# Patient Record
Sex: MALE | Race: WHITE
[De-identification: names, ages, dates, MRNs, and addresses within clinical notes are randomized per-mention and may not be internally consistent; named-entity substitution may affect disease eponyms.]

---

## 2019-07-08 ENCOUNTER — HOSPITAL ENCOUNTER (INPATIENT)
Dept: HOSPITAL 53 - M ED | Age: 79
LOS: 2 days | Discharge: HOME | DRG: 205 | End: 2019-07-10
Attending: GENERAL PRACTICE | Admitting: HOSPITALIST
Payer: MEDICARE

## 2019-07-08 VITALS — HEIGHT: 67 IN | BODY MASS INDEX: 40.62 KG/M2 | WEIGHT: 258.82 LBS

## 2019-07-08 DIAGNOSIS — E87.70: ICD-10-CM

## 2019-07-08 DIAGNOSIS — Z88.5: ICD-10-CM

## 2019-07-08 DIAGNOSIS — M19.012: ICD-10-CM

## 2019-07-08 DIAGNOSIS — M19.011: ICD-10-CM

## 2019-07-08 DIAGNOSIS — K21.9: ICD-10-CM

## 2019-07-08 DIAGNOSIS — Z96.659: ICD-10-CM

## 2019-07-08 DIAGNOSIS — R07.9: ICD-10-CM

## 2019-07-08 DIAGNOSIS — J44.9: ICD-10-CM

## 2019-07-08 DIAGNOSIS — R53.81: ICD-10-CM

## 2019-07-08 DIAGNOSIS — E78.00: ICD-10-CM

## 2019-07-08 DIAGNOSIS — I34.0: ICD-10-CM

## 2019-07-08 DIAGNOSIS — M16.0: ICD-10-CM

## 2019-07-08 DIAGNOSIS — E66.2: Primary | ICD-10-CM

## 2019-07-08 DIAGNOSIS — D63.8: ICD-10-CM

## 2019-07-08 DIAGNOSIS — Z90.5: ICD-10-CM

## 2019-07-08 DIAGNOSIS — Z66: ICD-10-CM

## 2019-07-08 DIAGNOSIS — N25.81: ICD-10-CM

## 2019-07-08 DIAGNOSIS — N40.0: ICD-10-CM

## 2019-07-08 DIAGNOSIS — N18.6: ICD-10-CM

## 2019-07-08 DIAGNOSIS — Z79.899: ICD-10-CM

## 2019-07-08 DIAGNOSIS — I48.92: ICD-10-CM

## 2019-07-08 DIAGNOSIS — I13.2: ICD-10-CM

## 2019-07-08 DIAGNOSIS — Z90.49: ICD-10-CM

## 2019-07-08 DIAGNOSIS — R09.02: ICD-10-CM

## 2019-07-08 DIAGNOSIS — E87.1: ICD-10-CM

## 2019-07-08 DIAGNOSIS — I50.32: ICD-10-CM

## 2019-07-08 DIAGNOSIS — I27.20: ICD-10-CM

## 2019-07-08 DIAGNOSIS — Z99.2: ICD-10-CM

## 2019-07-08 LAB
ALBUMIN SERPL BCG-MCNC: 3.3 GM/DL (ref 3.2–5.2)
ALT SERPL W P-5'-P-CCNC: 14 U/L (ref 12–78)
APTT BLD: 35.7 SECONDS (ref 25–38.4)
BILIRUB SERPL-MCNC: 0.5 MG/DL (ref 0.2–1)
BUN SERPL-MCNC: 29 MG/DL (ref 7–18)
CALCIUM SERPL-MCNC: 9.5 MG/DL (ref 8.8–10.2)
CHLORIDE SERPL-SCNC: 95 MEQ/L (ref 98–107)
CK MB CFR.DF SERPL CALC: 2.86
CK MB SERPL-MCNC: < 1 NG/ML (ref ?–3.6)
CK SERPL-CCNC: 35 U/L (ref 39–308)
CO2 SERPL-SCNC: 33 MEQ/L (ref 21–32)
CREAT SERPL-MCNC: 5.32 MG/DL (ref 0.7–1.3)
GFR SERPL CREATININE-BSD FRML MDRD: 11.2 ML/MIN/{1.73_M2} (ref 42–?)
GLUCOSE SERPL-MCNC: 118 MG/DL (ref 70–100)
HCT VFR BLD AUTO: 28.4 % (ref 42–52)
HGB BLD-MCNC: 9.2 G/DL (ref 13.5–17.5)
INR PPP: 1.27
MCH RBC QN AUTO: 31.9 PG (ref 27–33)
MCHC RBC AUTO-ENTMCNC: 32.4 G/DL (ref 32–36.5)
MCV RBC AUTO: 98.6 FL (ref 80–96)
NT-PRO BNP: (no result) PG/ML (ref ?–450)
PLATELET # BLD AUTO: 135 10^3/UL (ref 150–450)
POTASSIUM SERPL-SCNC: 3.8 MEQ/L (ref 3.5–5.1)
PROT SERPL-MCNC: 6.8 GM/DL (ref 6.4–8.2)
PROTHROMBIN TIME: 15.6 SECONDS (ref 11.8–14)
RBC # BLD AUTO: 2.88 10^6/UL (ref 4.3–6.1)
SODIUM SERPL-SCNC: 137 MEQ/L (ref 136–145)
TROPONIN I SERPL-MCNC: 0.02 NG/ML (ref ?–0.1)
WBC # BLD AUTO: 11.2 10^3/UL (ref 4–10)

## 2019-07-08 PROCEDURE — 5A1D70Z PERFORMANCE OF URINARY FILTRATION, INTERMITTENT, LESS THAN 6 HOURS PER DAY: ICD-10-PCS | Performed by: INTERNAL MEDICINE

## 2019-07-08 NOTE — REPVR
EXAM: 

 CT Angiography Chest With Contrast 



EXAM DATE/TIME: 

 7/8/2019 11:00 PM 



CLINICAL HISTORY: 

 78 years old, male; Dyspnea and shortness of breath; Additional info: Dysp 



TECHNIQUE: 

 Imaging protocol: Axial computed tomographic angiography images of the chest 

with intravenous contrast using CT angiography protocol. Coronal and sagittal 

reformatted images were created and reviewed. 

 3D rendering: MIP reconstructed images were created and reviewed. 

 Radiation optimization: All CT scans at this facility use at least one of 

these dose optimization techniques: automated exposure control; mA and/or kV 

adjustment per patient size (includes targeted exams where dose is matched to 

clinical indication); or iterative reconstruction. 

 Contrast material: ISOVUE 370; Contrast volume: 75 ml; Contrast route: IV; 



COMPARISON: 

 CR Chest, 2 view PA, Lat 7/8/2019 9:50 PM 



FINDINGS: 

 Pulmonary arteries: There are no pulmonary emboli. Contrast density in the 

pulmonary arteries not optimized to exclude small peripheral pulmonary emboli. 

 Aorta: The aorta demonstrates mild atherosclerotic calcification. There is no 

aortic dissection or aneurysm. 

 Lungs: Bibasilar infiltrates likely atelectatic. Clinical correlation to 

exclude infection suggested. 

 Pleural space: Unremarkable. No pneumothorax. No pleural effusion. 

 Heart: There is moderate atherosclerotic calcification of the coronary 

arteries. Small pericardial effusion. Cardiomegaly. 

 Kidneys and ureters: Right renal atrophy. Status post left nephrectomy. 

 Lymph nodes: Unremarkable. No enlarged lymph nodes. 

 Bones/joints: Arthropathic changes in the glenohumeral joints. The spine 

demonstrates mild degenerative changes. 

 Soft tissues: Unremarkable. 



IMPRESSION: 

1. Bibasilar infiltrates likely atelectatic. Clinical correlation to exclude 

infection suggested. 

2. There is no aortic dissection or aneurysm. 

3. Small pericardial effusion. 

4. There are no pulmonary emboli. Contrast density in the pulmonary arteries 

not optimized to exclude small peripheral pulmonary emboli. 



Electronically signed by: Isaias Olivarez On 07/08/2019  23:40:31 PM

## 2019-07-09 VITALS — DIASTOLIC BLOOD PRESSURE: 56 MMHG | SYSTOLIC BLOOD PRESSURE: 132 MMHG

## 2019-07-09 VITALS — SYSTOLIC BLOOD PRESSURE: 108 MMHG | DIASTOLIC BLOOD PRESSURE: 52 MMHG

## 2019-07-09 VITALS — SYSTOLIC BLOOD PRESSURE: 134 MMHG | DIASTOLIC BLOOD PRESSURE: 43 MMHG

## 2019-07-09 LAB
ALBUMIN SERPL BCG-MCNC: 3.1 GM/DL (ref 3.2–5.2)
BUN SERPL-MCNC: 38 MG/DL (ref 7–18)
CALCIUM SERPL-MCNC: 9.6 MG/DL (ref 8.8–10.2)
CHLORIDE SERPL-SCNC: 100 MEQ/L (ref 98–107)
CO2 SERPL-SCNC: 31 MEQ/L (ref 21–32)
CREAT SERPL-MCNC: 6.13 MG/DL (ref 0.7–1.3)
FERRITIN SERPL-MCNC: 1343 NG/ML (ref 26–388)
GFR SERPL CREATININE-BSD FRML MDRD: 9.5 ML/MIN/{1.73_M2} (ref 42–?)
GLUCOSE SERPL-MCNC: 111 MG/DL (ref 70–100)
HCT VFR BLD AUTO: 27.3 % (ref 42–52)
HGB BLD-MCNC: 8.5 G/DL (ref 13.5–17.5)
IRON SATN MFR SERPL: 9 % (ref 19.7–50)
IRON SERPL-MCNC: 19 UG/DL (ref 65–175)
MCH RBC QN AUTO: 30.1 PG (ref 27–33)
MCHC RBC AUTO-ENTMCNC: 31.1 G/DL (ref 32–36.5)
MCV RBC AUTO: 96.8 FL (ref 80–96)
PHOSPHATE SERPL-MCNC: 4 MG/DL (ref 2.5–4.9)
PLATELET # BLD AUTO: 127 10^3/UL (ref 150–450)
POTASSIUM SERPL-SCNC: 3.5 MEQ/L (ref 3.5–5.1)
RBC # BLD AUTO: 2.82 10^6/UL (ref 4.3–6.1)
SODIUM SERPL-SCNC: 132 MEQ/L (ref 136–145)
TIBC SERPL-MCNC: 210 UG/DL (ref 250–450)
TROPONIN I SERPL-MCNC: 0.09 NG/ML (ref ?–0.1)
TSH SERPL DL<=0.005 MIU/L-ACNC: 0.89 UIU/ML (ref 0.36–3.74)
WBC # BLD AUTO: 11.4 10^3/UL (ref 4–10)

## 2019-07-09 RX ADMIN — APIXABAN SCH MG: 2.5 TABLET, FILM COATED ORAL at 09:59

## 2019-07-09 RX ADMIN — ACETAMINOPHEN SCH MG: 650 TABLET, FILM COATED, EXTENDED RELEASE ORAL at 20:23

## 2019-07-09 RX ADMIN — SEVELAMER CARBONATE SCH MG: 800 TABLET, FILM COATED ORAL at 17:36

## 2019-07-09 RX ADMIN — APIXABAN SCH MG: 2.5 TABLET, FILM COATED ORAL at 20:24

## 2019-07-09 RX ADMIN — DOCUSATE SODIUM SCH MG: 100 CAPSULE, LIQUID FILLED ORAL at 09:59

## 2019-07-09 RX ADMIN — ALUMINUM HYDROXIDE, MAGNESIUM HYDROXIDE, AND SIMETHICONE PRN ML: 200; 200; 20 SUSPENSION ORAL at 16:28

## 2019-07-09 RX ADMIN — FERROUS GLUCONATE SCH MG: 324 TABLET ORAL at 20:23

## 2019-07-09 RX ADMIN — LANTHANUM CARBONATE SCH MG: 500 TABLET, CHEWABLE ORAL at 17:41

## 2019-07-09 RX ADMIN — DOCUSATE SODIUM SCH MG: 100 CAPSULE, LIQUID FILLED ORAL at 20:24

## 2019-07-09 NOTE — HPEPDOC
General


Date of Admission


2019 at 01:46


Date of Service:  2019


Primary Care Physician:  Max Bob


Other Providers


Dr Leiva


Attending Physician:  NEERAJ VALE DO


Chief Complaint


The patient is a 78-year-old male admitted with a reason for visit of Chronic 

Renal Failure, Dyspnea.


Source:  Patient, Family, Old records


Exam Limitations:  No limitations, Mild cognitive slowing


Timing/Duration:  Week(s) (1-2), Getting worse, Changing over time, This evening


Severity:  Moderate


Associated Symptoms:  Chest Pain, Cough, Malaise, Nausea, Shortness of breath, 

Weakness, Other (increased acid reflux)





History of Present Illness


79 yo male with HTN and ESRD presented to ED with increasing reflux,CP, SOB and 

cough.  Patient had dialysis today  and felt fine but then when he returned 

home, per wife and daughter, he became more weak and confused with oxygen 

saturation 88% on RA.  They states every night for past 1-2 weeks, when he eats 

before bed (ie popcorn), he has increased gastric reflux and chest pain and SOB.

Workup in the ED showed a CXR without CHF or aspiration, CTA chest without PE. 

He was to be discharged home however, Per the ED , Dr Christian is requesting 

admission of this patient for further evaluation of his SOB, hypoxia and 

debility





Home Medications


Scheduled


Acetaminophen (Tylenol Arthritis) 650 Mg Tablet.er, 1,300 MG PO BID, (Reported)


Amlodipine Besylate (Amlodipine Besylate) 10 Mg Tablet, 10 MG PO 4XWK, 

(Reported)


   TUESDAY, THURSDAY, SATURDAY AND  


Amlodipine Besylate (Amlodipine Besylate) 10 Mg Tablet, 5 MG PO 3XW, (Reported)


   MONDAY, WEDNESDAY AND FRIDAY 


Calcitriol (Rocaltrol) 0.5 Mcg Capsule, 0.5 MCG PO 2XW, (Reported)


   TUESDAY AND THURSDAY AT NOON 


Cholecalciferol (Vitamin D3) (Vitamin D3) 50,000 Unit Capsule, 50,000 UNIT PO 

1XWK, (Reported)


   WEDNESDAY AT NOON 


Cinacalcet HCl (Cinacalcet HCl) 30 Mg Tablet, 30 MG PO QPM, (Reported)


   DINNER 


Ferrous Gluconate (Ferrous Gluconate) 324 Mg Tablet, 324 MG PO BID, (Reported)


   MORNING AND DINNER 


Finasteride (Finasteride) 5 Mg Tablet, 5 MG PO QPM, (Reported)


   DINNER 


Folic Acid/Vit B Complex and C (Gisela-[place]ugenia Tablet) 0.8 Mg Tablet, 1 TAB PO QPM, 

(Reported)


   DINNER 


Gabapentin (Gabapentin) 100 Mg Capsule, 100 MG PO QHS, (Reported)


L.acidoph/L.bulg/B.bif/S.therm (Bacid Caplet) 1 Each Tablet, 1 TAB PO QPM, 

(Reported)


   DINNER 


Labetalol HCl (Labetalol HCl) 200 Mg Tablet, 200 MG PO TID, (Reported)


   MORNING, NOON AND BEDTIME 


Lanthanum Carbonate (Lanthanum Carbonate) 500 Mg Tab.chew, 500 MG PO WM, 

(Reported)


Lidocaine/Prilocaine (Lidocaine-Prilocaine Cream) 2.5%/2.5% Cream..g., 1 DOSE 

TOP 3XW, (Reported)


   PRIOR TO DIALYSIS ON MONDAY, WEDNESDAY AND FRIDAY 


[place]-3/Dha/Epa/Fish Oil (Omega-3 Fish Oil 1,000 mg Sfgl) 1,000 Mg Capsule, 2 

CAP PO QHS, (Reported)


Omeprazole (Omeprazole) 20 Mg Capsule.dr, 20 MG PO DAILY, (Reported)


   NOON 


Pravastatin Sodium (Pravastatin Sodium) 10 Mg Tablet, 10 MG PO QHS, (Reported)


Ropinirole HCl (Ropinirole HCl) 0.5 Mg Tablet, 0.5 MG PO QHS, (Reported)


Sevelamer Carbonate (Renvela) 800 Mg Tablet, 800 MG PO WM, (Reported)


Terazosin HCl (Terazosin HCl) 5 Mg Capsule, 5 MG PO DAILY, (Reported)





Scheduled PRN


Albuterol Sulf (Albuterol Sulfate) 2.5 Mg/3 Ml Vial.neb, 2.5 MG INH QID PRN for 

SHORTNESS OF BREATH, (Reported)


Ipratropium Bromide (Ipratropium Bromide) 0.2 Mg/1 Ml Solution, 0.5 MG INH BID 

PRN for SHORTNESS OF BREATH, (Reported)


   MIXES WITH ALBUTEROL TWICE A DAY AS NEEDED 


Mometasone Furoate (Asmanex) 220 Mcg Aer.pow.ba, 1 PUFF INH BID PRN for 

SHORTNESS OF BREATH, (Reported)





Allergies


Coded Allergies:  


     codeine (Verified  Allergy, Unknown, 19)





Past Medical History


Medical History


ESRD (dialysis M,W,F)   


HTN   


COPD (due to exposure at saw [employer])  


OA hip and shoulders


HE DENIES DM,HYPOTHYROID,CAD or LISSET





Past surgical history:


partial nephrectomy right


total nephrectomy left


partial bowel resection


2 knee replacement





Family history: mother  brain aneurysm, father  cancer





Social history: never smoked. 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 

etoh use





A-FIB/CHADSVASC


A-FIB History


Current/History of A-Fib/PAF?:  No





Review of Systems


Other systems


10 systems reviewed and negative except as per HPI





Physical Examination


General Exam:  Positive: Alert, Cooperative, Mild Distress, Other (pickwickian 

appearing)


Eye Exam:  Positive: PERRLA, Conjunctiva & lids normal, EOMI


ENT Exam:  Positive: Atraumatic, Mucous membr. moist/pink, Pharynx Normal


Neck Exam:  Positive: Supple, +2 carotid pulse wo bruit, Other (no JVD;  thick 

neck circumfrance)


Chest Exam:  Positive: Clear to auscultation, Normal air movement; 


   Negative: Rales, Rhonchi, Wheezing, Diminished


Heart Exam:  Positive: Rate Normal, Irregular Rhythm


Telemetry:  Positive: Other Telemetry: (aflutter at 70 bpm)


Abdomen Exam:  Positive: Normal bowel sounds, Soft (NT ND , obese)


Extremity Exam:  Positive: Edema (1+ bilateral edema to legs), Normal pulses, 

Other (left arm fistula intact with thrill); 


   Negative: Clubbing, Cyanosis


Skin Exam:  Positive: Nl turgor and temperature


Neuro Exam:  Positive: Normal Speech, Strength at 5/5 X4 ext (tested while 

sitting at edge of bed.  ), Sensation Intact, Cranial Nerves 3-12 NL


Psych Exam:  Positive: Mood NL, Oriented x 3, Other (mental status slow but 

intact)


Other physical findings


CTA CHEST:IMPRESSION: 


1. Bibasilar infiltrates likely atelectatic. Clinical correlation to exclude 


infection suggested. 


2. There is no aortic dissection or aneurysm. 


3. Small pericardial effusion. 


4. There are no pulmonary emboli. Contrast density in the pulmonary arteries 


not optimized to exclude small peripheral pulmonary emboli. 





EKG reviewed and rate controlled aflutter present





Vital Signs





Vital Signs








  Date Time  Temp Pulse Resp B/P (MAP) Pulse Ox O2 Delivery O2 Flow Rate FiO2


 


19 00:45  64 18 122/55 (77) 93 Nasal Cannula 2.0 


 


19 20:24 99.4       











Laboratory Data


Labs 24H


Laboratory Tests 2


19 20:31: 


Nucleated Red Blood Cells % (auto) 0.0, Prothrombin Time 15.6H, Prothromb Time 

International Ratio 1.27, Activated Partial Thromboplast Time 35.7, Anion Gap 9,

Glomerular Filtration Rate 11.2L, Blood Urea Nitrogen 29H, Creatinine 5.32H, 

Sodium Level 137, Potassium Level 3.8, Chloride Level 95L, Carbon Dioxide Level 

33H, Calcium Level 9.5, Aspartate Amino Transf (AST/SGOT) 9, Alanine 

Aminotransferase (ALT/SGPT) 14, Total Creatine Kinase 35L, Alkaline Phosphatase 

62, Total Bilirubin 0.5, Total Protein 6.8, Albumin 3.3, Creatine Kinase MB < 

1.0, Creatine Kinase MB Relative Index 2.86, Troponin I 0.02, NT-Pro-B-Type 

Natriuretic Peptide 76616H, Albumin/Globulin Ratio 0.94L


CBC/BMP


Laboratory Tests


19 20:31








Red Blood Count 2.88 L, Mean Corpuscular Volume 98.6 H, Mean Corpuscular 

Hemoglobin 31.9, Mean Corpuscular Hemoglobin Concent 32.4, Red Cell Distribution

Width 14.8 H, Calcium Level 9.5, Aspartate Amino Transf (AST/SGOT) 9, Alanine 

Aminotransferase (ALT/SGPT) 14, Total Creatine Kinase 35 L, Alkaline Phosphatase

62, Total Bilirubin 0.5, Total Protein 6.8, Albumin 3.3





 Assessment/Plan


Chest pain /indigestion - check serial troponin, consider cardiac echo; trial of

GI cocktail.   If negative workup, may need to consider outpatient evaluation 

for GI (ie EGD, reflux study,etc) and cardiac stress test, etc. 





Hypoxia (ox sat RA 88%)


   Rocio


   worked at [facility] - may need to consider OUTPATIENT pulmonary consult for 

possible LISSET or fibrosis (although not seen on CT scan)





COPD by history without exacerbation - continue nebs, prn oxygen.  he has not 

been started on steroids.  med rec pending





ESRD - consult nephrology - dialysis M,W,F





Debility - consult PT/OT





CODE STATUS: DNR


DVT prophylaxis: SC heparin





Plan / VTE


VTE Prophylaxis Ordered?:  Yes











NEERAJ VALE DO                2019 02:00

## 2019-07-09 NOTE — CR
DATE OF CONSULTATION: 07/08/2019

 

CONSULTATION FOR:  Adina Churchill MD

 

REASON FOR CONSULTATION:

To assist in the management of end-stage renal disease and shortness of breath.

 

HISTORY OF PRESENT ILLNESS:

Mr. Zuleta is a 78-year-old gentleman with a history of morbid obesity, end-stage

renal disease, hypertension, gastroesophageal reflux disease and secondary

hyperparathyroidism.  He woke up at 3:00 a.m. on July 8th due to shortness of

breath.  He does monitor his oxygen saturation at home and it was in 80s.  He did

go for his regular dialysis treatment in the [facility], however, his oxygen

saturation did not improve even after dialysis due to which he presented to the

emergency room here at [facility].  His oxygen saturation improved

with oxygen supplementation, however, he was still quite symptomatic and could

not lay down at all.  He had a CT angiogram done which showed bilateral basilar

infiltrates but no evidence of any major pulmonary embolus.  The patient was

admitted last evening.  I was called by the emergency room physician for

consultation and did see the patient in the emergency room last evening.

 

PAST MEDICAL AND SURGICAL HISTORY:

Significant for:

1.    History of end-stage renal disease requiring maintenance hemodialysis.

2.    Hypertension.

3.    COPD.

4.    Obstructive sleep apnea.

5.    Osteoarthritis.

6.    Secondary hyperparathyroidism.

7.    Hypercholesterolemia.

8.    Morbid obesity.

9.    Chronic back pain.

10.  Benign prostatic hypertrophy.

 

Past surgical history is significant for partial right nephrectomy, total left

nephrectomy, partial bowel resection, knee replacement and AV fistula creation.

 

MEDICATIONS:

His home medications include:  Tylenol as needed, amlodipine 10 mg daily,

calcitriol 0.5 mcg twice a week, vitamin D 50,000 units once a week, Sensipar 30

mg daily, ferrous gluconate 324 mg twice a day, finasteride 5 mg daily, folic

acid with vitamin B complex one tablet daily, gabapentin 100 mg at bedtime,

labetalol 200 mg twice a day, Fosrenol 500 mg three times a day with meals,

omeprazole 20 mg daily, pravastatin 10 mg daily, Requip 0.5 mg at bedtime,

Renvela 800 mg with meals and terazosin 5 mg daily.  He also uses albuterol

inhaler and Atrovent inhaler daily.

 

ALLERGIES:  He has allergy to CODEINE.

 

PERSONAL AND SOCIAL HISTORY:

The patient is  and lives with his family.  He denies any tobacco or

alcohol use.

 

FAMILY HISTORY:  Is negative for end-stage renal disease.

 

REVIEW OF SYSTEMS:

The patient denies any fever or chills.  No headache, sinus or throat problems

reported.  Cardiovascular system is significant for orthopnea and dyspnea.

Respiratory system is significant for chronic obstructive pulmonary artery

disease (COPD).  He denies any hemoptysis or pleuritic type of chest pain.  He

feels that he may have aspirated due to gastroesophageal reflux.  His oxygen

saturation was in the 80% range prior to admission.  GI system is significant for

gastroesophageal reflux disease (GERD).  He denies any diarrhea or abdominal

pain.   system is significant for benign prostatic hyperplasia (BPH).  There is

no history of dysuria or hematuria at present.  Musculoskeletal system

significant for morbid obesity.  He does not have any leg edema.  Endocrine

system is negative for diabetes.  He denies any thyroid problems but does have

secondary hyperparathyroidism.  Hematological system is significant for anemia of

chronic kidney disease.  Neurological system negative for seizures but does have

history of peripheral neuropathy and restless legs.  Psychosocial system negative

for depression or anxiety.

 

PHYSICAL EXAMINATION:

The patient is awake and alert at the time of my visit in the emergency room.

Temperature is 97 degrees Fahrenheit, heart rate 68 per minute and respiratory

rate 20 per minute.  Blood pressure 110/50 mmHg and oxygen saturation 89% on 2

liters of oxygen.  His head is atraumatic.  Neck is supple and jugular venous

distention (JVD) difficult to be assessed.  There is no oral thrush or ulcers.

Ears, nose and throat are unremarkable.  Heart exam reveals regular S1-S2.  Lungs

with slightly diminished breath sounds at bases and few basilar crackles.

Abdomen obese, soft and nontender and bowel sounds are normal.  Extremities

without any cyanosis or clubbing.  His left arm AV fistula is patent.

Neurologically he is awake, alert and oriented times three without a focal

deficit.

 

LABORATORY DATA:

WBC count 11.2, hemoglobin 9.2 and hematocrit 28.4.  Platelets 135.  Sodium 137,

potassium 3.8, CO2 33, BUN 29 and creatinine 5.32.  A pro-BNP level is 47,179.

Troponin 0.02 and 0.09.  Chest x-ray showed bibasilar atelectasis versus

infiltrate.  CT angiogram showed no evidence of a large pulmonary embolus.

Bibasilar atelectasis versus infiltrates noted.

 

PROBLEMS:

1.  End-stage renal disease.  The patient is regularly dialyzed on Monday,

Wednesday and Friday schedule.  He was dialyzed prior to coming to the emergency

room and there is no evidence of volume overload at this time.  I do not feel

that any emergent dialysis is indicated.  We will schedule his next dialysis on

July 10th.  He does have elevated pro-BNP level but no clinical evidence of

congestive heart failure at present.

 

2.  Hypoxemia.  I am concerned about possibility of infiltrates.  He thinks that

he may have aspirated.  He does have bibasilar atelectasis versus infiltrates.

He has morbid obesity and COPD with increased risk for sleep apnea.  I would

recommend to continue with nebulizers and broad-spectrum antibiotics for coverage

for possible aspiration pneumonia.  I will reevaluate him again tomorrow morning

for potential need for dialysis.

 

3.  Hypoxemia probably multifactorial.  At this point he does not seem to be

grossly volume overloaded.  We will monitor closely as his oxygen saturation has

improved with oxygen supplementation.  The patient will be reevaluated tomorrow

morning for further need for dialysis.

 

4.  Hypertension.  At present his blood pressure is somewhat on the low side.

His chronic antihypertensive meds should be continued.

 

5.  Anemia.  He does have anemia of chronic kidney disease without any evidence

of acute GI bleed.  CBC should be repeated again tomorrow morning.

 

Thank you for involving me in the care of Mr. Zuleta.  I will follow him along with

you.

## 2019-07-09 NOTE — IPNPDOC
Date Seen


The patient was seen on 7/9/19.





Progress Note


SUBJECTIVE: Pt says his sob is improved since dialysis yesterday. denies 

palpitations, lightheadedness, chest pressure, tightness, or pressure.


no fever or cough. still requiring o2. plans for more fluid removal per 

nephrology. Tele: Atrial Flutter rate controlled 65, started on renally dosed


eliqiuis.





Physical Examination


General Exam:  Positive: Alert, Cooperative, Mild Distress, Other (pickwickian 

appearing)


Eye Exam:  Positive: PERRLA, Conjunctiva & lids normal, EOMI


ENT Exam:  Positive: Atraumatic, Mucous membr. moist/pink, Pharynx Normal


Neck Exam:  Positive: Supple, +2 carotid pulse wo bruit, Other (no JVD;  thick 

neck circumfrance)


Chest Exam:  Positive: Clear to auscultation, Normal air movement; 


   Negative: Rales, Rhonchi, Wheezing, Diminished


Heart Exam:  Positive: Rate Normal, Irregular Rhythm


Telemetry:  Positive: Other Telemetry: (aflutter at 70 bpm)


Abdomen Exam:  Positive: Normal bowel sounds, Soft (NT ND , obese)


Extremity Exam:  Positive: Edema (1+ bilateral edema to legs), Normal pulses, 

Other (left arm fistula intact with thrill); 


   Negative: Clubbing, Cyanosis


Skin Exam:  Positive: Nl turgor and temperature


Neuro Exam:  Positive: Normal Speech, Strength at 5/5 X4 ext (tested while 

sitting at edge of bed.  ), Sensation Intact, Cranial Nerves 3-12 NL


Psych Exam:  Positive: Mood NL, Oriented x 3, Other (mental status slow but 

intact)


Other physical findings





CTA CHEST:IMPRESSION: 


1. Bibasilar infiltrates likely atelectatic. Clinical correlation to exclude 


infection suggested. 


2. There is no aortic dissection or aneurysm. 


3. Small pericardial effusion. 


4. There are no pulmonary emboli. Contrast density in the pulmonary arteries 


not optimized to exclude small peripheral pulmonary emboli. 





LABORATORY DATA, IMAGING STUDIES, MICROBIOLOGY: PLS SEE BELOW





 Assessment/Plan


77 yo male with HTN and ESRD presented to ED with increasing reflux,CP, SOB and 

cough.  Patient had dialysis today 7/8 and felt fine but then when he returned 

home, per wife and daughter, he became more weak and confused with oxygen 

saturation 88% on RA.  They states every night for past 1-2 weeks, when he eats 

before bed (ie popcorn), he has increased gastric reflux and chest pain and SOB.

Workup in the ED showed a CXR without CHF or aspiration, CTA chest without PE. 

He was to be discharged home however, Per the ED , Dr Christian is requesting 

admission of this patient for further evaluation of his SOB, hypoxia and 

debility.





Fluid Overload/Hypervolemia


-Nephrology consulted for fluid management


-ON HD MWF





Atrial Flutter, new onset


-on eliquis


-rate controlled


-TSH wnl





Hypoxia O2 sat 88%


-due to fluid overload/hypervolemia s/p dialysis, further management per neph

rology


-new onset Atrial flutter


-check noc ox-r/o elisha


-possible obesity hypoventilation : monitor for hypercarbia and mental status 

changes, will need outpt fu for PFTs





COPD by history without exacerbation - continue nebs, prn oxygen.  he has not 

been started on steroids.  





ESRD - consult nephrology - dialysis M,W,F





Debility - consult PT/OT





CODE STATUS: DNR


DVT prophylaxis: SC heparin





Plan / VTE


VTE Prophylaxis Ordered?:  Yes








VS, I&O, 24H, Fishbone


Vital Signs/I&O





Vital Signs








  Date Time  Temp Pulse Resp B/P (MAP) Pulse Ox O2 Delivery O2 Flow Rate FiO2


 


7/9/19 14:00 96.5 66 20 108/52 (70) 94  2.0 


 


7/9/19 04:46      Nasal Cannula  














I&O- Last 24 Hours up to 6 AM 


 


 7/9/19





 06:00


 


Intake Total 100 ml


 


Balance 100 ml











Laboratory Data


24H LABS


Laboratory Tests 2


7/8/19 20:31: 


Nucleated Red Blood Cells % (auto) 0.0, Prothrombin Time 15.6H, Prothromb Time 

International Ratio 1.27, Activated Partial Thromboplast Time 35.7, Anion Gap 9,

Glomerular Filtration Rate 11.2L, Blood Urea Nitrogen 29H, Creatinine 5.32H, 

Sodium Level 137, Potassium Level 3.8, Chloride Level 95L, Carbon Dioxide Level 

33H, Calcium Level 9.5, Aspartate Amino Transf (AST/SGOT) 9, Alanine 

Aminotransferase (ALT/SGPT) 14, Total Creatine Kinase 35L, Alkaline Phosphatase 

62, Total Bilirubin 0.5, Total Protein 6.8, Albumin 3.3, Creatine Kinase MB < 

1.0, Creatine Kinase MB Relative Index 2.86, Troponin I 0.02, NT-Pro-B-Type 

Natriuretic Peptide 83373Z, Albumin/Globulin Ratio 0.94L


7/9/19 05:49: 


Nucleated Red Blood Cells % (auto) 0.0, Anion Gap 1L, Glomerular Filtration Rate

9.5L, Blood Urea Nitrogen 38H, Creatinine 6.13H, Sodium Level 132L, Potassium 

Level 3.5, Chloride Level 100, Carbon Dioxide Level 31, Calcium Level 9.6, Albu

min 3.1L, Troponin I 0.09#, NT-Pro-B-Type Natriuretic Peptide 15711V, Phosphorus

Level 4.0, Thyroid Stimulating Hormone (TSH) 0.885


CBC/BMP


Laboratory Tests


7/8/19 20:31








Red Blood Count 2.88 L, Mean Corpuscular Volume 98.6 H, Mean Corpuscular 

Hemoglobin 31.9, Mean Corpuscular Hemoglobin Concent 32.4, Red Cell Distribution

Width 14.8 H, Calcium Level 9.5, Aspartate Amino Transf (AST/SGOT) 9, Alanine 

Aminotransferase (ALT/SGPT) 14, Total Creatine Kinase 35 L, Alkaline Phosphatase

62, Total Bilirubin 0.5, Total Protein 6.8, Albumin 3.3





7/9/19 05:49








Red Blood Count 2.82 L, Mean Corpuscular Volume 96.8 H, Mean Corpuscular 

Hemoglobin 30.1, Mean Corpuscular Hemoglobin Concent 31.1 L, Red Cell 

Distribution Width 14.9 H, Anion Gap 1 L











CIERRA MARTÍNEZ MD             Jul 9, 2019 15:56

## 2019-07-09 NOTE — ECHO
DATE OF PROCEDURE:  07/09/2019

 

REFERRING PHYSICIAN:  Adina Churchill

 

INDICATION:  Dysrhythmia.

 

Height 170 cm, weight 118 kg.

 

DIMENSIONS:

IVS:  1.1

LV:  7.3

LVPW:  1.1

LA:  5.1

Aorta:  3.6

IVC:  2.7

Left atrial volume index:  48

 

FINDINGS:

The study is of rather limited technical quality corresponding to patient's body

habitus.  The patient is in atrial flutter with controlled rate.

 

Left ventricle is severely dilated but overall systolic function is probably

normal or near normal based on limited views.  Right ventricle does not appear

grossly enlarged.  Both atria appear severely enlarged.  Aortic valve is

sclerotic with some restriction of cusp mobility.  Unfortunately technical

limitation of the study did not allow proper visualization of the valve.  There

are mild degenerative abnormalities of mitral valve with mitral annular

calcifications.  Mitral and tricuspid valves appear normal.  No pericardial

effusion is noted.  Inferior vena cava is severely dilated, and there is minimal

appreciable collapse with respiration indicative of very high central venous

pressure.  Aortic root is normal.  Aortic arch and abdominal aorta were not well

seen.

 

Doppler interrogation of aortic valve reveals no insufficiency and mild stenosis

with mean gradient 14 mmHg.  There is approximately mild-to-moderate mitral

insufficiency and mild tricuspid insufficiency.  Calculated pulmonary artery

pressure is at a minimum in 40s corresponding to moderate pulmonary hypertension.

Pulmonic valve is functionally competent.

 

Evaluation of diastolic function is inconclusive due to underlying atrial

flutter.

 

CONCLUSIONS:

1.  Study is of fair technical quality corresponding to patient's body habitus.

2.  Dilated left ventricle with probably normal or near normal left ventricular

(LV) systolic function.

3.  Severe biatrial enlargement.

4.  Aortic sclerosis resulting in mild stenosis and no significant

insufficiency.

5.  Mild-to-moderate mitral insufficiency.

6.  High central venous pressure and at least moderate pulmonary hypertension.

 

COMMENT:  Subacute bacterial endocarditis (SBE) prophylaxis is not recommended.

## 2019-07-09 NOTE — REP
CHEST TWO VIEWS:

 

Two views of the chest performed and compared to prior study 10/21/2014.  The

heart is enlarged.  There is calcification of the thoracic aorta.  Mediastinal

silhouette is unchanged.  There is scattered interstitial fibrotic change with

mild patchy bibasilar atelectasis/infiltrate.  There are degenerative changes of

the spine.

 

IMPRESSION:

 

Cardiomegaly.

 

Mild patchy bibasilar atelectasis/infiltrate.

 

 

Electronically Signed by

Neal Cerna MD 07/10/2019 09:50 A

## 2019-07-09 NOTE — ECGEPIP
[facility]

                                       

                                       Test Date:    2019

Pat Name:     THOMAS CASTRO              Department:   

Patient ID:   [id number]                 Room:         [place] [address]

Gender:       Male                     Technician:   ADRIANA

:          1940               Requested By: CIERRA GIBSON

Order Number: [id number]     Reading MD:   Marylin Riley

                                 Measurements

Intervals                              Axis          

Rate:         62                       P:            

OK:           -1                       QRS:          

QRSD:         154                      T:            82

QT:           402                                    

QTc:          409                                    

                           Interpretive Statements

ATRIAL FLUTTER

LEFT BUNDLE BRANCH BLOCK

SIMILAR TO19

Electronically Signed on 2019 10:06:56 EDT by Marylin Riley

## 2019-07-09 NOTE — IPN
DATE:  07/09/2019

 

Mr. Zuleta is seen this morning on his bedside.  He remains short of breath and is

using oxygen via nasal cannula.  He was admitted last evening with hypoxemia and

shortness of breath.  CT angiogram was negative for any significant pulmonary

embolus.  He did have some bilateral atelectasis versus infiltrates.

 

PHYSICAL EXAMINATION:

Temperature 96.9 degrees Fahrenheit, heart rate 58 per minute and respiratory

rate 20 per minute.  Blood pressure 134/43 mmHg and oxygen saturation 98% on 2

liters oxygen.

Through the night, patient was on parametria was noticed to have atrial flutter

episodes.  At present, he seems to be in regular sinus rhythm again.

His heart sounds are regular.

Lungs with slightly diminished breath sounds bilaterally.

Neck veins are difficult to be assessed.

There is no oral thrush or ulcers.

Abdomen soft and nontender.  Bowel sounds normal.

Extremities have no cyanosis or clubbing.  Lower extremity edema is at least 1+.

Neurologically, he is awake, alert and oriented times three.

 

Today's labs show WBC count 11.4, hemoglobin 8.5 and hematocrit 27.3.

 

Sodium 132, potassium 3.5, CO2 31, BUN 38 and creatinine 6.13.

 

INR was 1.27 yesterday.

 

PROBLEMS:

1.  Hypoxemia.  Probably he may have some volume issue here as he has been in

atrial flutter at times.  Patient will be brought to dialysis and we will try to

remove at least 2-2.5 liters of fluid as tolerated with ultrafiltration today.

 

2.  End-stage renal disease.  Patient was dialyzed on Monday and his next regular

dialysis will be on Wednesday.  We will try to remove fluid today again and no

dialysis.  Regular dialysis will be done tomorrow and more fluid removal will be

attempted in order to improve his volume status.

 

3.  Anemia.  His anemia is slightly worse, but no active bleeding noticed.  Will

continue to monitor closely.

 

4.  Hyponatremia.  Sodium level did go down to 132 from 137.  It is likely to

correct with dialysis and electrolytes will be checked daily.

 

5.  Congestive heart failure.  Most likely related to atrial flutter and

end-stage renal disease.  His brain natriuretic peptide (BNP) is repeated and

still elevated at about 45,000.  We will recheck his BNP after fluid removal and

see how he does.

## 2019-07-10 VITALS — SYSTOLIC BLOOD PRESSURE: 131 MMHG | DIASTOLIC BLOOD PRESSURE: 59 MMHG

## 2019-07-10 LAB
BUN SERPL-MCNC: 33 MG/DL (ref 7–18)
CALCIUM SERPL-MCNC: 10.5 MG/DL (ref 8.8–10.2)
CHLORIDE SERPL-SCNC: 99 MEQ/L (ref 98–107)
CO2 SERPL-SCNC: 29 MEQ/L (ref 21–32)
CREAT SERPL-MCNC: 5.35 MG/DL (ref 0.7–1.3)
GFR SERPL CREATININE-BSD FRML MDRD: 11.1 ML/MIN/{1.73_M2} (ref 42–?)
GLUCOSE SERPL-MCNC: 101 MG/DL (ref 70–100)
HCT VFR BLD AUTO: 29.3 % (ref 42–52)
HGB BLD-MCNC: 9 G/DL (ref 13.5–17.5)
MCH RBC QN AUTO: 30.6 PG (ref 27–33)
MCHC RBC AUTO-ENTMCNC: 30.7 G/DL (ref 32–36.5)
MCV RBC AUTO: 99.7 FL (ref 80–96)
PLATELET # BLD AUTO: 125 10^3/UL (ref 150–450)
POTASSIUM SERPL-SCNC: 3.9 MEQ/L (ref 3.5–5.1)
RBC # BLD AUTO: 2.94 10^6/UL (ref 4.3–6.1)
SODIUM SERPL-SCNC: 138 MEQ/L (ref 136–145)
WBC # BLD AUTO: 9.1 10^3/UL (ref 4–10)

## 2019-07-10 RX ADMIN — LANTHANUM CARBONATE SCH MG: 500 TABLET, CHEWABLE ORAL at 06:12

## 2019-07-10 RX ADMIN — SEVELAMER CARBONATE SCH MG: 800 TABLET, FILM COATED ORAL at 06:13

## 2019-07-10 RX ADMIN — FERROUS GLUCONATE SCH MG: 324 TABLET ORAL at 06:13

## 2019-07-10 RX ADMIN — APIXABAN SCH MG: 2.5 TABLET, FILM COATED ORAL at 06:13

## 2019-07-10 RX ADMIN — DOCUSATE SODIUM SCH MG: 100 CAPSULE, LIQUID FILLED ORAL at 06:11

## 2019-07-10 RX ADMIN — ALUMINUM HYDROXIDE, MAGNESIUM HYDROXIDE, AND SIMETHICONE PRN ML: 200; 200; 20 SUSPENSION ORAL at 06:09

## 2019-07-10 RX ADMIN — ACETAMINOPHEN SCH MG: 650 TABLET, FILM COATED, EXTENDED RELEASE ORAL at 06:11

## 2019-07-10 NOTE — DS.PDOC
Discharge Summary


General


Date of Admission


Jul 9, 2019 at 01:46


Date of Discharge


July 10, 2019





Discharge Summary


CONSULTANT:


NEPHROLOGIST: DR. HANNAH VANCE





DISCHARGE DIAGNOSES:


Fluid Overload/Hypervolemia


Atrial Flutter, new onset


Acute Hypoxic Respiratory Failure


COPD by history without exacerbation 


ESRD -  dialysis M,W,F


CHF Diastolic dysfunction with preserved EF


moderate pulmonary HTN


Mild to moderate mitral insufficiency


Biatrial enalrgement





DISCHARGE MEDICATIONS: PLS SEE BELOW





HISTORY OF PRESENTING ILLNESS:


79 yo male with HTN and ESRD presented to ED with increasing reflux,CP, SOB and 

cough.  Patient had dialysis today 7/8 and felt fine but then when he returned 

home, per wife and daughter, he became more weak and confused with oxygen 

saturation 88% on RA.  They states every night for past 1-2 weeks, when he eats 

before bed (ie popcorn), he has increased gastric reflux and chest pain and SOB.

Workup in the ED showed a CXR without CHF or aspiration, CTA chest without PE. 

He was to be discharged home however, Per the ED , Dr Christian is requesting 

admission of this patient for further evaluation of his SOB, hypoxia and 

debility.





HOSPITAL COURSE:


Fluid Overload/Hypervolemia


-Nephrology consulted for fluid management


-ON HD MWF


-2liters removed via dialysis, with 1kg weight loss and improved bnp overnight 

with resolution of hypoxia.





Atrial Flutter, new onset


-on eliquis 2.5 mg bid, renally dosed.


-rate controlled


-TSH wnl





Acute Hypoxic Respiratory Failure


- O2 sat 88%


-due to fluid overload/hypervolemia s/p dialysis, further management per 

nephrology


-due to new onset Atrial flutter, rate controlled 


-outpt referral for sleep study to rule out LISSET


-possible obesity hypoventilation : monitor for hypercarbia and mental status 

changes, will need outpt fu for PFTs








CHF with preserved systolic function


-diastolic dysfunction


-elevated bnp improved after 2liters removed via dialysis with 1 kg loss in the 

past 24hrs.


-fluid restriction


-strict i/o


-weigh daily





COPD by history without exacerbation - continue nebs, prn oxygen.  he has not 

been started on steroids.  





ESRD - consulted nephrology - dialysis M,W,F


-s/p 2liters removed via dialysis 





Mild to moderate Mitral Insufficiency


-outpt fu 


-pcp to refer to cardiology





Morbid Obesity BMI 40.5


-complicating care





Debility - consult PT/OT





CODE STATUS: DNR


DVT prophylaxis: SC heparin





Plan / VTE


VTE Prophylaxis Ordered?:  Yes








DISCHARGE PHYSICAL EXAMINATION:


VITALS: PLS SEE BELOW


General Exam:  Positive: Alert, Cooperative, Mild Distress, Other (pickwickian 

appearing)


Eye Exam:  Positive: PERRLA, Conjunctiva & lids normal, EOMI


ENT Exam:  Positive: Atraumatic, Mucous membr. moist/pink, Pharynx Normal


Neck Exam:  Positive: Supple, +2 carotid pulse wo bruit, Other (no JVD;  thick 

neck circumfrance)


Chest Exam:  Positive: Clear to auscultation, Normal air movement; 


   Negative: Rales, Rhonchi, Wheezing, Diminished


Heart Exam:  Positive: Rate Normal, Irregular Rhythm


Telemetry:  Positive: Other Telemetry: (aflutter at 70 bpm)


Abdomen Exam:  Positive: Normal bowel sounds, Soft (NT ND , obese)


Extremity Exam:  Positive: Edema (1+ bilateral edema to legs), Normal pulses, 

Other (left arm fistula intact with thrill); 


   Negative: Clubbing, Cyanosis


Skin Exam:  Positive: Nl turgor and temperature


Neuro Exam:  Positive: Normal Speech, Strength at 5/5 X4 ext (tested while 

sitting at edge of bed.  ), Sensation Intact, Cranial Nerves 3-12 NL


Psych Exam:  Positive: Mood NL, Oriented x 3, Other (mental status slow but 

intact)


Other physical findings





CTA CHEST:IMPRESSION: 


1. Bibasilar infiltrates likely atelectatic. Clinical correlation to exclude 


infection suggested. 


2. There is no aortic dissection or aneurysm. 


3. Small pericardial effusion. 


4. There are no pulmonary emboli. Contrast density in the pulmonary arteries 


not optimized to exclude small peripheral pulmonary emboli. 





LABORATORY DATA, IMAGING STUDIES, MICROBIOLOGY: PLS SEE BELOW





DATE OF PROCEDURE:  07/09/2019


 


REFERRING PHYSICIAN:  Adina Churchill


 


INDICATION:  Dysrhythmia.


 


Height 170 cm, weight 118 kg.


 


DIMENSIONS:


IVS:  1.1


LV:  7.3


LVPW:  1.1


LA:  5.1


Aorta:  3.6


IVC:  2.7


Left atrial volume index:  48


 


FINDINGS:


The study is of rather limited technical quality corresponding to patient's body


habitus.  The patient is in atrial flutter with controlled rate.


 


Left ventricle is severely dilated but overall systolic function is probably


normal or near normal based on limited views.  Right ventricle does not appear


grossly enlarged.  Both atria appear severely enlarged.  Aortic valve is


sclerotic with some restriction of cusp mobility.  Unfortunately technical


limitation of the study did not allow proper visualization of the valve.  There


are mild degenerative abnormalities of mitral valve with mitral annular


calcifications.  Mitral and tricuspid valves appear normal.  No pericardial


effusion is noted.  Inferior vena cava is severely dilated, and there is minimal


appreciable collapse with respiration indicative of very high central venous


pressure.  Aortic root is normal.  Aortic arch and abdominal aorta were not well


seen.


 


Doppler interrogation of aortic valve reveals no insufficiency and mild stenosis


with mean gradient 14 mmHg.  There is approximately mild-to-moderate mitral


insufficiency and mild tricuspid insufficiency.  Calculated pulmonary artery


pressure is at a minimum in 40s corresponding to moderate pulmonary 

hypertension.


Pulmonic valve is functionally competent.


 


Evaluation of diastolic function is inconclusive due to underlying atrial


flutter.


 


CONCLUSIONS:


1.  Study is of fair technical quality corresponding to patient's body habitus.


2.  Dilated left ventricle with probably normal or near normal left ventricular


(LV) systolic function.


3.  Severe biatrial enlargement.


4.  Aortic sclerosis resulting in mild stenosis and no significant


insufficiency.


5.  Mild-to-moderate mitral insufficiency.


6.  High central venous pressure and at least moderate pulmonary hypertension.


 


COMMENT:  Subacute bacterial endocarditis (SBE) prophylaxis is not recommended.


DD:     Brayan Godoy MD  07/09/19 1951


DT:     SHAUNNA  07/09/19 2016  





TIME SPENT ON DISCHARGE: 32 MINUTES





Vital Signs/I&Os





Vital Signs








  Date Time  Temp Pulse Resp B/P (MAP) Pulse Ox O2 Delivery O2 Flow Rate FiO2


 


7/10/19 06:44  79  131/59    


 


7/10/19 06:00 96.9  18  95   


 


7/9/19 22:00        


 


7/9/19 04:46      Nasal Cannula  














I&O- Last 24 Hours up to 6 AM 


 


 7/10/19





 06:00


 


Intake Total 840 ml


 


Output Total 2000 ml


 


Balance -1160 ml











Laboratory Data


Labs 24H


Laboratory Tests 2


7/10/19 06:04: 


Nucleated Red Blood Cells % (auto) 0.0, Anion Gap 10, Glomerular Filtration Rate

11.1L, Blood Urea Nitrogen 33H, Creatinine 5.35H, Sodium Level 138, Potassium 

Level 3.9, Chloride Level 99, Carbon Dioxide Level 29, Calcium Level 10.5H


CBC/BMP


Laboratory Tests


7/10/19 06:04








Red Blood Count 2.94 L, Mean Corpuscular Volume 99.7 H, Mean Corpuscular Hemogl

obin 30.6, Mean Corpuscular Hemoglobin Concent 30.7 L, Red Cell Distribution 

Width 14.8 H, Calcium Level 10.5 H





Discharge Medications


Scheduled


Acetaminophen (Tylenol Arthritis) 650 Mg Tablet.er, 1,300 MG PO BID, (Reported)


Amlodipine Besylate (Amlodipine Besylate) 10 Mg Tablet, 10 MG PO 4XWK, 

(Reported)


   TUESDAY, THURSDAY, SATURDAY AND SUNDAY 


Amlodipine Besylate (Amlodipine Besylate) 10 Mg Tablet, 5 MG PO 3XW, (Reported)


   MONDAY, WEDNESDAY AND FRIDAY 


Apixaban (Eliquis) 2.5 Mg Tablet, 2.5 MG PO BID


Calcitriol (Rocaltrol) 0.5 Mcg Capsule, 0.5 MCG PO 2XW, (Reported)


   TUESDAY AND THURSDAY AT NOON 


Cholecalciferol (Vitamin D3) (Vitamin D3) 50,000 Unit Capsule, 50,000 UNIT PO 

1XWK, (Reported)


   WEDNESDAY AT NOON 


Cinacalcet HCl (Cinacalcet HCl) 30 Mg Tablet, 30 MG PO QPM, (Reported)


   DINNER 


Ferrous Gluconate (Ferrous Gluconate) 324 Mg Tablet, 324 MG PO BID, (Reported)


   MORNING AND DINNER 


Finasteride (Finasteride) 5 Mg Tablet, 5 MG PO QPM, (Reported)


   DINNER 


Folic Acid/Vit B Complex and C (Gisela-Eugenia Tablet) 0.8 Mg Tablet, 1 TAB PO QPM, 

(Reported)


   DINNER 


Gabapentin (Gabapentin) 100 Mg Capsule, 100 MG PO QHS, (Reported)


L.acidoph/L.bulg/B.bif/S.therm (Bacid Caplet) 1 Each Tablet, 1 TAB PO QPM, 

(Reported)


   DINNER 


Lanthanum Carbonate (Lanthanum Carbonate) 500 Mg Tab.chew, 500 MG PO WM, 

(Reported)


Lidocaine/Prilocaine (Lidocaine-Prilocaine Cream) 2.5%/2.5% Cream..g., 1 DOSE 

TOP 3XW, (Reported)


   PRIOR TO DIALYSIS ON MONDAY, WEDNESDAY AND FRIDAY 


[place]-3/Dha/Epa/Fish Oil (Omega-3 Fish Oil 1,000 mg Sfgl) 1,000 Mg Capsule, 2 

CAP PO QHS, (Reported)


Omeprazole (Omeprazole) 20 Mg Capsule.dr, 20 MG PO DAILY, (Reported)


   NOON 


Pravastatin Sodium (Pravastatin Sodium) 10 Mg Tablet, 10 MG PO QHS, (Reported)


Ropinirole HCl (Ropinirole HCl) 0.5 Mg Tablet, 0.5 MG PO QHS, (Reported)


Sevelamer Carbonate (Renvela) 800 Mg Tablet, 800 MG PO WM, (Reported)


Terazosin HCl (Terazosin HCl) 5 Mg Capsule, 5 MG PO DAILY, (Reported)





Scheduled PRN


Albuterol Sulf (Albuterol Sulfate) 2.5 Mg/3 Ml Vial.neb, 2.5 MG INH QID PRN for 

SHORTNESS OF BREATH, (Reported)


Ipratropium Bromide (Ipratropium Bromide) 0.2 Mg/1 Ml Solution, 0.5 MG INH BID 

PRN for SHORTNESS OF BREATH, (Reported)


   MIXES WITH ALBUTEROL TWICE A DAY AS NEEDED 


Mometasone Furoate (Asmanex) 220 Mcg Aer.pow.ba, 1 PUFF INH BID PRN for 

SHORTNESS OF BREATH, (Reported)





Allergies


Coded Allergies:  


     codeine (Verified  Allergy, Unknown, 7/8/19)











CIERRA MARTÍNEZ MD            Jul 10, 2019 20:56

## 2019-07-10 NOTE — IPN
DATE: 07/09/2019

 

Mr. Zuleta is seen this morning on his bedside.  He underwent dialysis yesterday

for ultrafiltration and his dyspnea has improved.  We removed 2.5 liters fluid.

His oxygen saturation is now up to 95% on room air.  The patient is feeling well

and wants to go home.  He denies any nausea, vomiting, chest pain, fever or

chills.

 

PHYSICAL EXAMINATION:

Temperature 96.9 degrees Fahrenheit, heart rate 80 per minute and respiratory

rate 18 per minute.  Blood pressure 131/59 mmHg and oxygen saturation 95% on room

air.

His head is atraumatic.

Neck is supple and  jugular venous distention (JVD) is difficult to be assessed.

 

Heart:  Sounds are regular.

Lungs with diminished breath sounds but no wheezing or rales.

Abdomen:  Soft and nontender and bowel sounds are normal.

Extremities have no cyanosis or clubbing.  His AV fistula left arm is patent.

Neurologically he is awake, alert and oriented times three.

 

Today's labs show WBC count 9.1, hemoglobin 9.0 and hematocrit 29.3.  Platelets

125.  Sodium 138, potassium 3.9, CO2 29, BUN 33 and creatinine 5.35.  Her calcium

level is 10.5.  His iron level was 19 yesterday and saturation 9.0%.  His

ferritin 1343.

 

PROBLEM:

1.  Hypoxemia:  His oxygen saturation is now up to 95% on room air.  He had an

ultrafiltration yesterday and 2.5 liters of fluid was removed without any

difficulty.  Today's his regular dialysis treatment and we will try to remove

another couple of liters and adjust his dry weight.

 

2.  End-stage renal disease:  The patient is regularly dialyzed on Monday,

Wednesday and Friday schedule.  He had his regular dialysis on Monday and then

extra ultrafiltration yesterday.  He will go to outpatient dialysis clinic today

for his regular dialysis treatment.

 

3.  Anemia:  His anemia related to end-stage renal disease and iron deficiency.

He will receive IV iron with dialysis.

 

4.  Atrial flutter:  The patient was noticed to have atrial flutter during his

stay here.  He seems to be in sinus rhythm now.  Patient has been placed on

Eliquis 2.5 mg twice a day.

## 2019-07-10 NOTE — ECGEPIP
[facility] - ED

                                       

                                       Test Date:    2019

Pat Name:     THOMAS CASTRO              Department:   

Patient ID:   [id number]                 Room:         [place] [address]

Gender:       Male                     Technician:   VANNESA

:          1940               Requested By: EULA CHANCE 

Order Number: [id number]     Reading MD:   Perry Booker

                                 Measurements

Intervals                              Axis          

Rate:         69                       P:            

SD:           -1                       QRS:          

QRSD:         141                      T:            129

QT:           429                                    

QTc:          462                                    

                           Interpretive Statements

ATRIAL FLUTTER

LEFT BUNDLE BRANCH BLOCK

NO PRIORS FOR COMPARISON

Electronically Signed on 7- 3:30:38 EDT by Perry Booker